# Patient Record
Sex: MALE | Race: ASIAN
[De-identification: names, ages, dates, MRNs, and addresses within clinical notes are randomized per-mention and may not be internally consistent; named-entity substitution may affect disease eponyms.]

---

## 2021-02-22 NOTE — CONSULTATION
DATE OF CONSULTATION:  02/17/2021

GASTROENTEROLOGY CONSULTATION



CONSULTING PHYSICIAN:  Aj Abdalla MD.



CHIEF COMPLAINT:  Referral for colonoscopy.



HISTORY OF PRESENT ILLNESS:  This is a very pleasant 70-year-old male, who

has history of colonic polyps in the last colonoscopy in 2015.

Recommendation was to have a colonoscopy in five years.  The patient is

due for colonoscopy.  He was referred for possible colonoscopy.



PAST MEDICAL HISTORY:

1. Colonic polyps.

2. Dyslipidemia.

3. Internal hemorrhoids.



ALLERGIES:  No known drug allergies.



PAST SURGICAL HISTORY:  None.



FAMILY HISTORY:  Noncontributory.



SOCIAL HISTORY:  The patient denies any tobacco, alcohol, or drug abuse.



REVIEW OF SYSTEMS:  A 10-point review of systems was performed and

pertinent positives in HPI.



PHYSICAL EXAMINATION:

VITAL SIGNS:  Temperature 97.7, blood pressure 167/78, pulse 60,

respirations 20.  Height is 5 feet 5 inches.  Weight is 144.

HEENT:  Normocephalic and atraumatic.  Sclerae anicteric.

NECK:  Supple.  No evidence of obvious lymphadenopathy.

CARDIOVASCULAR:  Regular rate and rhythm.  Plus S1, S2.

LUNGS:  Clear to auscultation bilaterally.

ABDOMEN:  Positive bowel sounds.  Soft and nontender.  No rebound.  No

guarding.  No peritoneal sign.

EXTREMITIES:  No cyanosis, no clubbing, no edema.



ASSESSMENT AND PLAN:  This is a 70-year-old male referred to us for _____

colonoscopy, last colonoscopy 2015 with polyps.  The patient was informed

of the risks and benefits of procedure.  The prep was explained to him.

We will schedule him for as soon as possible.









  ______________________________________________

  Aj Abdalla M.D.





DR:  ADAM

D:  02/22/2021 14:17

T:  02/22/2021 15:53

JOB#:  [id number]

CC:

## 2021-03-03 ENCOUNTER — HOSPITAL ENCOUNTER (OUTPATIENT)
Dept: HOSPITAL 72 - GAS | Age: 71
Discharge: HOME | End: 2021-03-03
Payer: COMMERCIAL

## 2021-03-03 VITALS — DIASTOLIC BLOOD PRESSURE: 82 MMHG | SYSTOLIC BLOOD PRESSURE: 129 MMHG

## 2021-03-03 VITALS — SYSTOLIC BLOOD PRESSURE: 133 MMHG | DIASTOLIC BLOOD PRESSURE: 78 MMHG

## 2021-03-03 VITALS — SYSTOLIC BLOOD PRESSURE: 146 MMHG | DIASTOLIC BLOOD PRESSURE: 79 MMHG

## 2021-03-03 VITALS — SYSTOLIC BLOOD PRESSURE: 131 MMHG | DIASTOLIC BLOOD PRESSURE: 75 MMHG

## 2021-03-03 VITALS — DIASTOLIC BLOOD PRESSURE: 77 MMHG | SYSTOLIC BLOOD PRESSURE: 120 MMHG

## 2021-03-03 VITALS — DIASTOLIC BLOOD PRESSURE: 87 MMHG | SYSTOLIC BLOOD PRESSURE: 138 MMHG

## 2021-03-03 VITALS — SYSTOLIC BLOOD PRESSURE: 120 MMHG | DIASTOLIC BLOOD PRESSURE: 79 MMHG

## 2021-03-03 VITALS — SYSTOLIC BLOOD PRESSURE: 166 MMHG | DIASTOLIC BLOOD PRESSURE: 82 MMHG

## 2021-03-03 VITALS — BODY MASS INDEX: 23.66 KG/M2 | WEIGHT: 142 LBS | HEIGHT: 65 IN

## 2021-03-03 VITALS — DIASTOLIC BLOOD PRESSURE: 80 MMHG | SYSTOLIC BLOOD PRESSURE: 119 MMHG

## 2021-03-03 VITALS — SYSTOLIC BLOOD PRESSURE: 142 MMHG | DIASTOLIC BLOOD PRESSURE: 85 MMHG

## 2021-03-03 DIAGNOSIS — K57.90: ICD-10-CM

## 2021-03-03 DIAGNOSIS — Z12.11: Primary | ICD-10-CM

## 2021-03-03 DIAGNOSIS — E78.5: ICD-10-CM

## 2021-03-03 DIAGNOSIS — I10: ICD-10-CM

## 2021-03-03 DIAGNOSIS — K64.8: ICD-10-CM

## 2021-03-03 PROCEDURE — 94150 VITAL CAPACITY TEST: CPT

## 2021-03-03 PROCEDURE — 45378 DIAGNOSTIC COLONOSCOPY: CPT

## 2021-03-03 PROCEDURE — 94003 VENT MGMT INPAT SUBQ DAY: CPT

## 2021-03-03 PROCEDURE — 93005 ELECTROCARDIOGRAM TRACING: CPT

## 2021-03-03 NOTE — ANETHESIA PREOPERATIVE EVAL
Anesthesia Pre-op PMH/ROS


General


Date of Evaluation:  Mar 3, 2021


Time of Evaluation:  08:49


Anesthesiologist:  Dariusz


ASA Score:  ASA 3


Mallampati Score


Class I : Soft palate, uvula, fauces, pillars visible


Class II: Soft palate, uvula, fauces visible


Class III: Soft palate, base of uvula visible


Class IV: Only hard plate visible


Mallampati Classification:  Class II


Surgeon:  Lianne


Diagnosis:  Abd Pain


Surgical Procedure:  Colonoscopy


Anesthesia History:  none


Family History:  no anesthesia problems


Allergies:  


Coded Allergies:  


     No Known Allergies (Unverified , 3/1/21)


Medications:  see eMAR


Patient NPO?:  Yes





Past Medical History


Cardiovascular:  Reports: HTN, other - HL





Anesthesia Pre-op Phys. Exam


Physician Exam





Last Vital Signs








  Date Time  Temp Pulse Resp B/P (MAP) Pulse Ox O2 Delivery O2 Flow Rate FiO2


 


3/3/21 07:43  74 20 146/79 99 Room Air  


 


3/3/21 07:31 97.5       








Constitutional:  NAD


Neurologic:  CN 2-12 intact


Cardiovascular:  RRR


Respiratory:  CTA


Gastrointestinal:  S/NT/ND





Airway Exam


Mallampati Score:  Class II


MO:  full


ROM:  full


Teeth:  missing, intact





Anesthesia Pre-op A/P


Risk Assessment & Plan


Assessment:


ASA 3


Plan:


TIVA


Status Change Before Surgery:  No











Itz Arzola MD                 Mar 3, 2021 09:23

## 2021-03-03 NOTE — PRE-PROCEDURE NOTE/ATTESTATION
Pre-Procedure Note/Attestation


Complete Prior to Procedure


Planned Procedure:  not applicable


Procedure Narrative:


colonoscopy





Indications for Procedure


Pre-Operative Diagnosis:


screening





Attestation


I attest that I discussed the nature of the procedure; its benefits; risks and 

complications; and alternatives (and the risks and benefits of such 

alternatives), prior to the procedure, with the patient (or the patient's legal 

representative).





I attest that, if there was a reasonable possibility of needing a blood tra

nsfusion, the patient (or the patient's legal representative) was given the 

[facility] standardized written summary, pursuant 

to the Malachi Bebo Blood Safety Act ([place] Health and Safety Code # 1645, as 

amended).





I attest that I re-evaluated the patient just prior to the surgery and that 

there has been no change in the patient's H&P, except as documented below:











Aj Abdalla MD              Mar 3, 2021 09:07

## 2021-03-03 NOTE — 48 HOUR POST ANESTHESIA EVAL
Post Anesthesia Evaluation


Procedure:  Colonoscopy


Date of Evaluation:  Mar 3, 2021


Time of Evaluation:  12:14


Blood Pressure Systolic:  154


0:  83


Pulse Rate:  74


Respiratory Rate:  18


Temperature (Fahrenheit):  98.2


O2 Sat by Pulse Oximetry:  99


Airway:  patent


Nausea:  No


Vomiting:  No


Pain Intensity:  1


Hydration Status:  adequate


Cardiopulmonary Status:


Stable


Mental Status/LOC:  patient returned to baseline


Follow-up Care/Observations:


00


Post-Anesthesia Complications:


00


Follow-up care needed:  ready to discharge











Itz Arzola MD                 Mar 3, 2021 09:24

## 2021-03-03 NOTE — PROCEDURE NOTE
DATE OF PROCEDURE:  03/03/2021

SURGEON:  Aj Abdalla MD.



PROCEDURE:  Colonoscopy.



ANESTHESIOLOGIST:  Per Dr. Arzoal.



INSTRUMENT:  Olympus adult flexible colonoscope.



INDICATIONS:  Screening colonoscopy.



REASON FOR PROCEDURE:  The procedure, risks, benefits, and possible

consequences, including hemorrhage, aspiration, perforation and infection,

and alternative treatments, were explained to the patient/legal guardian

by Dr. Aj Abdalla and the patient/legal guardian understood and

accepted these risks.



DESCRIPTION OF PROCEDURE:  After informed consent was obtained and the

patient was adequately sedated, first rectal exam was performed, which was

normal.  Then, the scope was advanced from rectum to the cecum, then

subsequently to terminal ileum.  Quality of prep was very good.



The patient had normal colonoscopy examination except for one

diverticula in the left colon.  No obvious mass, polyp, or any pathology

was seen.  Retroflexion of the rectum showed evidence of medium-sized

internal hemorrhoids.



SUMMARY OF FINDINGS:

1. One single diverticula in the left colon.

2. Internal hemorrhoids.



RECOMMENDATIONS:  Repeat colonoscopy in 10 years.









  ______________________________________________

  Aj Abdalla M.D. DR:  ADAM

D:  03/03/2021 09:19

T:  03/03/2021 09:31

JOB#:  [id number]

CC:

## 2021-03-03 NOTE — SHORT STAY SURGERY H&P
History of Present Illness


History of Present Illness


Chief Complaint


screening colon


see office consult note


HPI


Les Watts is a 70 year old male who was admitted on  for Colon Scre

ening





Patient History


Allergies:  


Coded Allergies:  


     No Known Allergies (Unverified , 3/1/21)





Medication History


Scheduled


Multivitamin With Minerals (Multivitamins With Minerals*), 1 TAB ORAL DAILY, 

(Reported)


Simvastatin (Zocor), 20 MG ORAL BEDTIME, (Reported)





Physical Exam


Vital Signs





Last Vital Signs








  Date Time  Temp Pulse Resp B/P (MAP) Pulse Ox O2 Delivery O2 Flow Rate FiO2


 


3/3/21 07:43  74 20 146/79 99 Room Air  


 


3/3/21 07:31 97.5       











Plan


Attestation


Are the patient's medical conditions optimized for surgery?











Aj Abdalla MD              Mar 3, 2021 09:08

## 2021-03-03 NOTE — ENDOSCOPY PROCEDURE NOTE
Endoscopy Procedure Note


General


Indication for Procedure:  screening


Procedures Performed:  colonoscopy


Operative Findings/Diagnosis:  hemorrhoids


Specimen:  none


Pt Tolerated Procedure Well:  Yes


Estimated Blood Loss:  none





Anesthesia


Anesthesiologist:  triston


Anesthesia:  MAC





Inserted Devices


Implant(s) used?:  No





Quality


Quality of Bowel Preparation:  Good


Did scope reach the cecum?:  Yes


Was there any complications?:  No





GI Core Measures


50 yrs or older w/o bx or poly:  Yes


10yrs. F/U recommended:  Yes


18 years or older w/prev. colo:  Yes


<3yrs. since last colonoscopy:  No











Aj Abdalla MD              Mar 3, 2021 09:19